# Patient Record
Sex: FEMALE | Race: WHITE | NOT HISPANIC OR LATINO | URBAN - METROPOLITAN AREA
[De-identification: names, ages, dates, MRNs, and addresses within clinical notes are randomized per-mention and may not be internally consistent; named-entity substitution may affect disease eponyms.]

---

## 2023-08-21 LAB — URINE CULTURE: ABNORMAL

## 2024-07-31 ENCOUNTER — OFFICE (OUTPATIENT)
Dept: URBAN - METROPOLITAN AREA CLINIC 26 | Facility: CLINIC | Age: 64
End: 2024-07-31

## 2024-07-31 VITALS
HEIGHT: 70 IN | SYSTOLIC BLOOD PRESSURE: 145 MMHG | DIASTOLIC BLOOD PRESSURE: 79 MMHG | HEART RATE: 76 BPM | WEIGHT: 293 LBS

## 2024-07-31 DIAGNOSIS — R79.82 ELEVATED C-REACTIVE PROTEIN (CRP): ICD-10-CM

## 2024-07-31 DIAGNOSIS — R19.7 DIARRHEA, UNSPECIFIED: ICD-10-CM

## 2024-07-31 DIAGNOSIS — R07.89 OTHER CHEST PAIN: ICD-10-CM

## 2024-07-31 PROCEDURE — 99214 OFFICE O/P EST MOD 30 MIN: CPT | Performed by: CLINICAL NURSE SPECIALIST

## 2024-07-31 RX ORDER — PROMETHAZINE HYDROCHLORIDE 25 MG/1
100 TABLET ORAL
Qty: 3 | Refills: 0 | Status: ACTIVE
Start: 2024-07-31

## 2024-07-31 RX ORDER — POLYETHYLENE GLYCOL-3350 AND ELECTROLYTES 236; 6.74; 5.86; 2.97; 22.74 G/274.31G; G/274.31G; G/274.31G; G/274.31G; G/274.31G
POWDER, FOR SOLUTION ORAL
Qty: 4000 | Refills: 0 | Status: ACTIVE
Start: 2024-07-31

## 2024-12-10 ENCOUNTER — ANCILLARY PROCEDURE (OUTPATIENT)
Dept: URGENT CARE | Age: 64
End: 2024-12-10
Payer: COMMERCIAL

## 2024-12-10 ENCOUNTER — OFFICE VISIT (OUTPATIENT)
Dept: URGENT CARE | Age: 64
End: 2024-12-10
Payer: COMMERCIAL

## 2024-12-10 VITALS
RESPIRATION RATE: 20 BRPM | DIASTOLIC BLOOD PRESSURE: 78 MMHG | SYSTOLIC BLOOD PRESSURE: 139 MMHG | OXYGEN SATURATION: 95 % | TEMPERATURE: 97.8 F | HEART RATE: 80 BPM

## 2024-12-10 DIAGNOSIS — R09.3 ABNORMAL SPUTUM: ICD-10-CM

## 2024-12-10 DIAGNOSIS — J22 LOWER RESPIRATORY TRACT INFECTION: ICD-10-CM

## 2024-12-10 DIAGNOSIS — R06.2 WHEEZING: Primary | ICD-10-CM

## 2024-12-10 DIAGNOSIS — R06.2 WHEEZING: ICD-10-CM

## 2024-12-10 PROCEDURE — 71046 X-RAY EXAM CHEST 2 VIEWS: CPT | Performed by: NURSE PRACTITIONER

## 2024-12-10 PROCEDURE — 99214 OFFICE O/P EST MOD 30 MIN: CPT | Performed by: NURSE PRACTITIONER

## 2024-12-10 RX ORDER — PIOGLITAZONEHYDROCHLORIDE 15 MG/1
15 TABLET ORAL
COMMUNITY
Start: 2023-08-01

## 2024-12-10 RX ORDER — PREDNISONE 20 MG/1
20 TABLET ORAL DAILY
Qty: 5 TABLET | Refills: 0 | Status: SHIPPED | OUTPATIENT
Start: 2024-12-10 | End: 2024-12-15

## 2024-12-10 RX ORDER — AZITHROMYCIN 250 MG/1
TABLET, FILM COATED ORAL
Qty: 6 TABLET | Refills: 0 | Status: SHIPPED | OUTPATIENT
Start: 2024-12-10

## 2024-12-10 RX ORDER — LISINOPRIL 20 MG/1
20 TABLET ORAL DAILY
COMMUNITY
Start: 2023-08-01

## 2024-12-10 RX ORDER — ALBUTEROL SULFATE 90 UG/1
INHALANT RESPIRATORY (INHALATION)
Qty: 18 G | Refills: 0 | Status: SHIPPED | OUTPATIENT
Start: 2024-12-10

## 2024-12-10 RX ORDER — BENZONATATE 100 MG/1
CAPSULE ORAL
Qty: 60 CAPSULE | Refills: 0 | Status: SHIPPED | OUTPATIENT
Start: 2024-12-10

## 2024-12-10 ASSESSMENT — ENCOUNTER SYMPTOMS
GASTROINTESTINAL NEGATIVE: 1
WHEEZING: 1
NEUROLOGICAL NEGATIVE: 1
COUGH: 1
HEMATOLOGIC/LYMPHATIC NEGATIVE: 1
EYES NEGATIVE: 1
CARDIOVASCULAR NEGATIVE: 1
ALLERGIC/IMMUNOLOGIC NEGATIVE: 1
CONSTITUTIONAL NEGATIVE: 1
ENDOCRINE NEGATIVE: 1
MUSCULOSKELETAL NEGATIVE: 1
PSYCHIATRIC NEGATIVE: 1

## 2024-12-10 NOTE — PROGRESS NOTES
"Subjective   Patient ID: Radhika Arellano \"Barb" is a 64 y.o. female. They present today with a chief complaint of Cough (X 1 wk) and Wheezing.    History of Present Illness  Patient is a 65 y/o female presenting with productive cough, wheezing x1 week. Patient denies symptoms of fever, chills, bodyaches, lethargy, weakness, chest pain/tightness/pressure, SOB,  N/V/D, ABD pain. No OTC medication reported to be taken.       Cough  Associated symptoms include wheezing.   Wheezing  Associated symptoms: cough        Past Medical History  Allergies as of 12/10/2024 - Reviewed 12/10/2024   Allergen Reaction Noted    Yellow jacket venom Anaphylaxis 2018    Celecoxib Hives 2017       (Not in a hospital admission)       Past Medical History:   Diagnosis Date    Encounter for screening for malignant neoplasm of vagina     Vaginal Pap smear    Personal history of other endocrine, nutritional and metabolic disease     History of hypothyroidism    Personal history of other medical treatment     History of mammogram       Past Surgical History:   Procedure Laterality Date     SECTION, CLASSIC  2014     Section    OTHER SURGICAL HISTORY  2014    Dental Surgery    OTHER SURGICAL HISTORY  2014    Knee Arthroscopy With Anterior Cruciate Ligament Repair            Review of Systems  Review of Systems   Constitutional: Negative.    HENT: Negative.     Eyes: Negative.    Respiratory:  Positive for cough and wheezing.    Cardiovascular: Negative.    Gastrointestinal: Negative.    Endocrine: Negative.    Genitourinary: Negative.    Musculoskeletal: Negative.    Skin: Negative.    Allergic/Immunologic: Negative.    Neurological: Negative.    Hematological: Negative.    Psychiatric/Behavioral: Negative.                                    Objective    Vitals:    12/10/24 1736   BP: 139/78   Pulse: 80   Resp: 20   Temp: 36.6 °C (97.8 °F)   TempSrc: Temporal   SpO2: 95%     No LMP " recorded.    Physical Exam    Procedures    Point of Care Test & Imaging Results from this visit  No results found for this visit on 12/10/24.   XR chest 2 views    Result Date: 12/10/2024  Interpreted By:  Terrence Cole, STUDY: XR CHEST 2 VIEWS;  12/10/2024 6:12 pm   INDICATION: Signs/Symptoms:wheezing, productive cough.   COMPARISON: Chest radiograph 09/23/2014   ACCESSION NUMBER(S): [id number]   ORDERING CLINICIAN: DOMINIC ACHARYA   FINDINGS:     CARDIOMEDIASTINAL SILHOUETTE: Cardiomediastinal silhouette is stable in size and configuration.   LUNGS: No consolidation, pneumothorax, or effusion.   ABDOMEN: No remarkable upper abdominal findings.   BONES: No acute osseous changes.   Remaining findings appear stable since prior comparison radiograph.       1.  No evidence of acute cardiopulmonary process.     Signed by: Terrence Cole 12/10/2024 6:22 PM Dictation workstation:   [id number]     Diagnostic study results (if any) were reviewed by ELIZABETH Streeter.    Assessment/Plan   Allergies, medications, history, and pertinent labs/EKGs/Imaging reviewed by ELIZABETH Streeter.     Medical Decision Making  CXR without acute cardiopulmonary process. Will treat for lower respiratory tract infection with Azithromycin, Prednisone, Tessalon Perles, Albuterol inhaler PRN. Trajectory of lower respiratory tract infection discussed with patient. Expect cough to linger on.     At time of discharge, patient was clinically well-appearing and appropriate for outpatient management. The patient/parent/guardian was educated regarding diagnosis, supportive care, OTC and Rx medications. The patient/parent/guardian was given the opportunity to ask questions prior to discharge. They verbalized understanding of discussion of treatment plan, expected course of illness and/or injury, indications on when to return to , when to seek further evaluation in ED/call 911, and the need to follow up with PCP and/or specialist as referred.  Patient/parent/guardian was provided with work/school documentation if requested. Patient stable upon discharge.     Orders and Diagnoses  Diagnoses and all orders for this visit:  Wheezing  -     XR chest 2 views; Future  -     azithromycin (Zithromax) 250 mg tablet; Take 2 tablets (500mg) by mouth once on day #1. Then take 1 tablet (250mg) by mouth once daily on days #2-5. Take with food  -     benzonatate (Tessalon) 100 mg capsule; Take 1-2 capsules by mouth every 8 hours as needed for cough. May cause drowsiness  -     predniSONE (Deltasone) 20 mg tablet; Take 1 tablet (20 mg) by mouth once daily for 5 days. Take in the morning with food  -     albuterol 90 mcg/actuation inhaler; Inhale 1-2 puffs every 4-6 hours as needed for wheezing, shortness of breath. Rinse mouth after use  Abnormal sputum  -     XR chest 2 views; Future  -     azithromycin (Zithromax) 250 mg tablet; Take 2 tablets (500mg) by mouth once on day #1. Then take 1 tablet (250mg) by mouth once daily on days #2-5. Take with food  -     benzonatate (Tessalon) 100 mg capsule; Take 1-2 capsules by mouth every 8 hours as needed for cough. May cause drowsiness  -     predniSONE (Deltasone) 20 mg tablet; Take 1 tablet (20 mg) by mouth once daily for 5 days. Take in the morning with food  -     albuterol 90 mcg/actuation inhaler; Inhale 1-2 puffs every 4-6 hours as needed for wheezing, shortness of breath. Rinse mouth after use  Lower respiratory tract infection  -     azithromycin (Zithromax) 250 mg tablet; Take 2 tablets (500mg) by mouth once on day #1. Then take 1 tablet (250mg) by mouth once daily on days #2-5. Take with food  -     benzonatate (Tessalon) 100 mg capsule; Take 1-2 capsules by mouth every 8 hours as needed for cough. May cause drowsiness  -     predniSONE (Deltasone) 20 mg tablet; Take 1 tablet (20 mg) by mouth once daily for 5 days. Take in the morning with food  -     albuterol 90 mcg/actuation inhaler; Inhale 1-2 puffs every 4-6  hours as needed for wheezing, shortness of breath. Rinse mouth after use      Medical Admin Record      Patient disposition: Home    Electronically signed by ELIZABETH Streeter  6:24 PM

## 2025-02-26 ENCOUNTER — OFFICE VISIT (OUTPATIENT)
Dept: URGENT CARE | Age: 65
End: 2025-02-26
Payer: COMMERCIAL

## 2025-02-26 VITALS
TEMPERATURE: 97.7 F | DIASTOLIC BLOOD PRESSURE: 65 MMHG | RESPIRATION RATE: 14 BRPM | HEART RATE: 98 BPM | OXYGEN SATURATION: 96 % | SYSTOLIC BLOOD PRESSURE: 119 MMHG

## 2025-02-26 DIAGNOSIS — L03.116 CELLULITIS OF LEFT LOWER EXTREMITY: Primary | ICD-10-CM

## 2025-02-26 RX ORDER — SULFAMETHOXAZOLE AND TRIMETHOPRIM 800; 160 MG/1; MG/1
2 TABLET ORAL 2 TIMES DAILY
Qty: 40 TABLET | Refills: 0 | Status: SHIPPED | OUTPATIENT
Start: 2025-02-26 | End: 2025-03-08

## 2025-02-26 NOTE — PROGRESS NOTES
"Subjective   Patient ID: Radhika Arellano \"Barb" is a 64 y.o. female. They present today with a chief complaint of Leg Pain (Possible cellulitis ).    Patient disposition: Home    HISTORY OF PRESENT ILLNESS:    This is an adult female with PMH including T2DM, cellulitis of the legs, and lymphedema of the LLE. She presents for suspicion of LLE cellulitis for 2 wks, worsening, with anterior shin redness and heat and pain and tenderness diffusely. Denies calf, foot pain. Denies radiation. Denies f/c/s. Can ambulate normally. Denies nausea, weakness.    Past Medical History  Allergies as of 2025 - Reviewed 2025   Allergen Reaction Noted    Yellow jacket venom Anaphylaxis 2018    Celecoxib Hives 2017       (Not in a hospital admission)       Past Medical History:   Diagnosis Date    Encounter for screening for malignant neoplasm of vagina     Vaginal Pap smear    Personal history of other endocrine, nutritional and metabolic disease     History of hypothyroidism    Personal history of other medical treatment     History of mammogram       Past Surgical History:   Procedure Laterality Date     SECTION, CLASSIC  2014     Section    OTHER SURGICAL HISTORY  2014    Dental Surgery    OTHER SURGICAL HISTORY  2014    Knee Arthroscopy With Anterior Cruciate Ligament Repair        reports that she has never smoked. She has never used smokeless tobacco. She reports that she does not drink alcohol and does not use drugs.    Review of Systems    Negative except as documented in the History of Present Illness.                             Objective    Vitals:    25 1834   BP: 119/65   Pulse: 98   Resp: 14   Temp: 36.5 °C (97.7 °F)   SpO2: 96%     No LMP recorded. Patient is postmenopausal.      PHYSICAL EXAMINATION:    CONSTITUTIONAL: nontoxic, ambulatory, well-appearing    EYES:  No scleral icterus or orbital trauma noted. No conjunctival injection. PERRLA. EOMI b/l. No " nystagmus.    HEENT:  Head and face are unremarkable and atraumatic. Mucous membranes moist. Nares are patent without copious rhinorrhea.  No lymphadenopathy.    CARDIOVASCULAR:  RRR, no m/r/g. Nl S1/S2.     LLE    *Distal pulses intact, capillary refill 1 second in distal digits.    LUNGS:  CTAB, no r/r/w. No dullness to percussion.    ABDOMEN:  Nonobese, nonscaphoid..    SKIN: There is chronic lymphedema of the LLE.  There is a large indistinct patch of erythema, induration, increased calor on the LLE anterior pretibial ankle. There is no fluctuance but the area is TTP.      MUSCULOSKELETAL:     LLE    * ROM is FROM wo pain    * Tenderness is present at cellulitis area described above    * Soft tissue swelling is as above    Gait is nl         NEURO:     LLE    * Motor function is distally intact    * Sensation is distally intact         PSYCH: Appropriate mood and affect.         ---------------------------------------------------------------         MDM:  Cellulitis present. Screened and negative for any concerning ssx of systemic involvement, deep tissue spread, osteomyelitis. She was Rxd Bactrim DS but at double strength/MRSA dosing moni due to high body mass. She was given strict verbal instructions to see ED reevaluation if any worsening of her condition in re limb or flulike sx. She had capacity and understood. She was also screened for, and negative for, clinical signs of DVT, since problem is focal and anterior.        Procedures    Diagnostic study results (if any) were reviewed by Pb Chin PA-C.    No results found for this visit on 02/26/25.     Assessment/Plan   Allergies, medications, history, and pertinent labs/EKGs/Imaging reviewed by Pb Chin PA-C.     Orders and Diagnoses  There are no diagnoses linked to this encounter.    Medical Admin Record      Follow Up Instructions  No follow-ups on file.    Electronically signed by Pb Chin PA-C  6:45 PM

## 2025-03-03 ENCOUNTER — OFFICE VISIT (OUTPATIENT)
Dept: URGENT CARE | Age: 65
End: 2025-03-03
Payer: COMMERCIAL

## 2025-03-03 VITALS
OXYGEN SATURATION: 96 % | TEMPERATURE: 97.3 F | RESPIRATION RATE: 18 BRPM | WEIGHT: 293 LBS | DIASTOLIC BLOOD PRESSURE: 73 MMHG | HEART RATE: 96 BPM | SYSTOLIC BLOOD PRESSURE: 123 MMHG

## 2025-03-03 DIAGNOSIS — L03.116 CELLULITIS OF LEFT LOWER EXTREMITY: Primary | ICD-10-CM

## 2025-03-03 PROBLEM — E11.9 TYPE 2 DIABETES MELLITUS WITHOUT COMPLICATION, WITHOUT LONG-TERM CURRENT USE OF INSULIN (MULTI): Status: ACTIVE | Noted: 2022-04-13

## 2025-03-03 PROBLEM — I89.0 LYMPHEDEMA OF BOTH LOWER EXTREMITIES: Status: ACTIVE | Noted: 2024-08-30

## 2025-03-03 PROBLEM — E55.9 VITAMIN D DEFICIENCY: Status: ACTIVE | Noted: 2022-03-25

## 2025-03-03 PROBLEM — E66.01 MORBID OBESITY (MULTI): Status: ACTIVE | Noted: 2018-12-11

## 2025-03-03 PROBLEM — I10 ESSENTIAL HYPERTENSION: Status: ACTIVE | Noted: 2022-03-25

## 2025-03-03 PROCEDURE — 3074F SYST BP LT 130 MM HG: CPT | Performed by: PHYSICIAN ASSISTANT

## 2025-03-03 PROCEDURE — 3078F DIAST BP <80 MM HG: CPT | Performed by: PHYSICIAN ASSISTANT

## 2025-03-03 PROCEDURE — 99213 OFFICE O/P EST LOW 20 MIN: CPT | Performed by: PHYSICIAN ASSISTANT

## 2025-03-03 PROCEDURE — 4010F ACE/ARB THERAPY RXD/TAKEN: CPT | Performed by: PHYSICIAN ASSISTANT

## 2025-03-03 RX ORDER — CEPHALEXIN 500 MG/1
500 CAPSULE ORAL 4 TIMES DAILY
Qty: 30 CAPSULE | Refills: 0 | Status: SHIPPED | OUTPATIENT
Start: 2025-03-03 | End: 2025-03-10

## 2025-03-03 ASSESSMENT — ENCOUNTER SYMPTOMS
ARTHRALGIAS: 0
FEVER: 0
SHORTNESS OF BREATH: 0
DIARRHEA: 0
JOINT SWELLING: 0
VOMITING: 0
CONFUSION: 0
NAUSEA: 0
CHEST TIGHTNESS: 0
COUGH: 0
FATIGUE: 0
AGITATION: 0
ABDOMINAL PAIN: 0
CHILLS: 0

## 2025-03-03 NOTE — PATIENT INSTRUCTIONS
Take medication as instructed  Seek medical attention for any worsening of condition  Follow up with PCP or Wound care ASAP

## 2025-03-03 NOTE — PROGRESS NOTES
"Subjective   Patient ID: Radhika Arellano \"Barb" is a 64 y.o. female. They present today with a chief complaint of cellulitis  (Left leg, not better).    History of Present Illness  Pt presented with infection of left lower leg. Was assessed at same [facility] 5days ago and diagnosed as cellulitis. Started tx with Bactrim and states did not see significant improvement but also denies worsening of condition. Hx significant with chronic lymphedema and type II DM. She said sometimes she followed up with wound clinic. No open wound or lesion at this point. Denies fever, chills.           Past Medical History  Allergies as of 2025 - Reviewed 2025   Allergen Reaction Noted    Yellow jacket venom Anaphylaxis 2018    Celecoxib Hives 2017       (Not in a hospital admission)       Past Medical History:   Diagnosis Date    Encounter for screening for malignant neoplasm of vagina     Vaginal Pap smear    Personal history of other endocrine, nutritional and metabolic disease     History of hypothyroidism    Personal history of other medical treatment     History of mammogram       Past Surgical History:   Procedure Laterality Date     SECTION, CLASSIC  2014     Section    OTHER SURGICAL HISTORY  2014    Dental Surgery    OTHER SURGICAL HISTORY  2014    Knee Arthroscopy With Anterior Cruciate Ligament Repair        reports that she has never smoked. She has never used smokeless tobacco. She reports that she does not drink alcohol and does not use drugs.    Review of Systems  Review of Systems   Constitutional:  Negative for chills, fatigue and fever.   Respiratory:  Negative for cough, chest tightness and shortness of breath.    Cardiovascular:  Negative for chest pain.   Gastrointestinal:  Negative for abdominal pain, diarrhea, nausea and vomiting.   Musculoskeletal:  Negative for arthralgias and joint swelling.   Skin:  Negative for rash and wound.        Skin erythema "   Psychiatric/Behavioral:  Negative for agitation and confusion.                                   Objective    Vitals:    03/03/25 1811   BP: 123/73   Pulse: 96   Resp: 18   Temp: 36.3 °C (97.3 °F)   SpO2: 96%   Weight: (!) 167 kg (369 lb)     No LMP recorded. Patient is postmenopausal.    Physical Exam  Constitutional:       Appearance: Normal appearance.   HENT:      Head: Normocephalic and atraumatic.      Nose: Nose normal.   Cardiovascular:      Rate and Rhythm: Normal rate and regular rhythm.      Heart sounds: No murmur heard.  Pulmonary:      Effort: Pulmonary effort is normal.      Breath sounds: Normal breath sounds.   Abdominal:      General: Abdomen is flat.      Palpations: Abdomen is soft.   Musculoskeletal:         General: No signs of injury.   Skin:     General: Skin is warm and dry.      Findings: No rash.      Comments: Bilateral lower extremity edema, poorly demarcated erythema of anterior aspect of left lower leg, tender on palpation, no open lesion   Neurological:      Mental Status: She is alert and oriented to person, place, and time.   Psychiatric:         Mood and Affect: Mood normal.         Procedures    Point of Care Test & Imaging Results from this visit  No results found for this visit on 03/03/25.   No results found.    Diagnostic study results (if any) were reviewed by Ellen Brooks PA-C.    Assessment/Plan   Allergies, medications, history, and pertinent labs/EKGs/Imaging reviewed by Ellen Brooks PA-C.     Medical Decision Making  No signs of systemic infection  Will add Keflex and recommended visit with PCP ASAP to reassess condition  Strict ER precautions for worsening of condition    Orders and Diagnoses  Diagnoses and all orders for this visit:  Cellulitis of left lower extremity  -     cephalexin (Keflex) 500 mg capsule; Take 1 capsule (500 mg) by mouth 4 times a day for 7 days.      Medical Admin Record      Patient disposition: Home    Electronically signed by Ellen Brooks  PA-C  2:18 PM

## 2025-03-04 ASSESSMENT — ENCOUNTER SYMPTOMS: WOUND: 0

## 2025-09-01 ENCOUNTER — ANCILLARY PROCEDURE (OUTPATIENT)
Dept: URGENT CARE | Age: 65
End: 2025-09-01
Payer: COMMERCIAL

## 2025-09-01 ENCOUNTER — OFFICE VISIT (OUTPATIENT)
Dept: URGENT CARE | Age: 65
End: 2025-09-01
Payer: COMMERCIAL

## 2025-09-01 VITALS
HEIGHT: 70 IN | DIASTOLIC BLOOD PRESSURE: 82 MMHG | SYSTOLIC BLOOD PRESSURE: 116 MMHG | RESPIRATION RATE: 18 BRPM | TEMPERATURE: 97.6 F | OXYGEN SATURATION: 97 % | HEART RATE: 146 BPM | WEIGHT: 293 LBS | BODY MASS INDEX: 41.95 KG/M2

## 2025-09-01 DIAGNOSIS — R00.0 TACHYCARDIA: ICD-10-CM

## 2025-09-01 DIAGNOSIS — Z86.711 HISTORY OF PULMONARY EMBOLISM: ICD-10-CM

## 2025-09-01 DIAGNOSIS — R06.02 SHORTNESS OF BREATH: Primary | ICD-10-CM

## 2025-09-01 DIAGNOSIS — I48.92 ATRIAL FLUTTER, UNSPECIFIED TYPE (MULTI): ICD-10-CM

## 2025-09-01 DIAGNOSIS — R94.31 ABNORMAL ECG: ICD-10-CM

## 2025-09-01 DIAGNOSIS — R06.02 SHORTNESS OF BREATH: ICD-10-CM

## 2025-09-01 PROCEDURE — 3079F DIAST BP 80-89 MM HG: CPT | Performed by: PHYSICIAN ASSISTANT

## 2025-09-01 PROCEDURE — 3008F BODY MASS INDEX DOCD: CPT | Performed by: PHYSICIAN ASSISTANT

## 2025-09-01 PROCEDURE — 1036F TOBACCO NON-USER: CPT | Performed by: PHYSICIAN ASSISTANT

## 2025-09-01 PROCEDURE — 99215 OFFICE O/P EST HI 40 MIN: CPT | Performed by: PHYSICIAN ASSISTANT

## 2025-09-01 PROCEDURE — 4010F ACE/ARB THERAPY RXD/TAKEN: CPT | Performed by: PHYSICIAN ASSISTANT

## 2025-09-01 PROCEDURE — 71046 X-RAY EXAM CHEST 2 VIEWS: CPT | Performed by: PHYSICIAN ASSISTANT

## 2025-09-01 PROCEDURE — 93000 ELECTROCARDIOGRAM COMPLETE: CPT | Performed by: PHYSICIAN ASSISTANT

## 2025-09-01 PROCEDURE — 3074F SYST BP LT 130 MM HG: CPT | Performed by: PHYSICIAN ASSISTANT

## 2026-01-16 ENCOUNTER — APPOINTMENT (OUTPATIENT)
Dept: PRIMARY CARE | Facility: CLINIC | Age: 66
End: 2026-01-16